# Patient Record
Sex: FEMALE | Race: WHITE
[De-identification: names, ages, dates, MRNs, and addresses within clinical notes are randomized per-mention and may not be internally consistent; named-entity substitution may affect disease eponyms.]

---

## 2017-02-28 ENCOUNTER — HOSPITAL ENCOUNTER (EMERGENCY)
Dept: HOSPITAL 25 - ED | Age: 72
Discharge: HOME | End: 2017-02-28
Payer: MEDICARE

## 2017-02-28 VITALS — DIASTOLIC BLOOD PRESSURE: 66 MMHG | SYSTOLIC BLOOD PRESSURE: 151 MMHG

## 2017-02-28 DIAGNOSIS — R04.0: Primary | ICD-10-CM

## 2017-02-28 DIAGNOSIS — Z87.891: ICD-10-CM

## 2017-02-28 LAB
HCT VFR BLD AUTO: 41 % (ref 35–47)
HGB BLD-MCNC: 14 G/DL (ref 12–16)
MCH RBC QN AUTO: 30 PG (ref 27–31)
MCHC RBC AUTO-ENTMCNC: 34 G/DL (ref 31–36)
MCV RBC AUTO: 87 FL (ref 80–97)
RBC # BLD AUTO: 4.73 10^6/UL (ref 4–5.4)
WBC # BLD AUTO: 10.3 10^3/UL (ref 3.5–10.8)

## 2017-02-28 PROCEDURE — 85610 PROTHROMBIN TIME: CPT

## 2017-02-28 PROCEDURE — 85730 THROMBOPLASTIN TIME PARTIAL: CPT

## 2017-02-28 PROCEDURE — 99283 EMERGENCY DEPT VISIT LOW MDM: CPT

## 2017-02-28 PROCEDURE — 85025 COMPLETE CBC W/AUTO DIFF WBC: CPT

## 2017-02-28 PROCEDURE — 36415 COLL VENOUS BLD VENIPUNCTURE: CPT

## 2017-02-28 NOTE — ED
Throat Pain/Nasal Congestion





- HPI Summary


HPI Summary: 


71F presents with nose bleed for a day.  She takes eliquis, brilinta, and 

aspirin every day for stent placement for MI.  She has had a history of bleeds 

due to being on blood thinners.  She states the bleeding started in her left 

nostril but then also started in the right nostril.  She says that she placed 

ice on the area and held pressure and it seems to slow it down last night but 

then she stood up and the bleeding was there again.  She denies any dizziness 

or lightheadedness.








- History of Current Complaint


Chief Complaint: EDEpistaxis


Time Seen by Provider: 02/28/17 10:49





- Allergies/Home Medications


Allergies/Adverse Reactions: 


 Allergies











Allergy/AdvReac Type Severity Reaction Status Date / Time


 


Bee Venom Allergy Severe Anaphylatic Verified 09/01/14 19:50





   Shock  


 


Sulfa Antibiotics Allergy Severe See Comment Verified 09/01/14 19:50














PMH/Surg Hx/FS Hx/Imm Hx


Endocrine/Hematology History: Reports: Hx Anticoagulant Therapy, Hx Thyroid 

Disease - hypothyroidism


Cardiovascular History: Reports: Hx Atrial Fibrillation, Hx Coronary Artery 

Disease, Hx Hypertension, Other Cardiovascular Problems/Disorders - CHOLESTEROL 

SLIGHTLY ELEVATED-NO MEDS, MYOPATHY


Respiratory History: Reports: Other Respiratory Problems/Disorders - new 

diagnosis emphysema 3 weeks ago


Musculoskeletal History: Reports: Hx Bursitis - RIGHT SHOULDER, Other 

Musculoskeletal History - NO ARTHRITIS, MUSCLES AND JOINTS HURT, MAINLY MUSCLES


Sensory History: Reports: Hx Cataracts - removed, Hx Contacts or Glasses - 

reading only


   Denies: Hx Hearing Aid


Opthamlomology History: Reports: Hx Cataracts - removed, Hx Contacts or Glasses 

- reading only





- Cancer History


Hx Chemotherapy: No


Hx Radiation Therapy: No





- Surgical History


Surgery Procedure, Year, and Place: 1988 LAPAROSCOPIC BILATERAL TUBAL LIGATION, 

[facility].  basal carcinoma face removed 1980


Hx Anesthesia Reactions: Yes - PANIC ATTACK WHEN WAKING UP/ EPINEPHRINE - MAKES 

JITTERY


Infectious Disease History: No


Infectious Disease History: Reports: Hx Shingles


   Denies: Hx Clostridium Difficile, Hx Hepatitis - Pt states, NEVER had 

hepatitis, has been erroniously in chart for years, Hx Human Immunodeficiency 

Virus (HIV), Hx of Known/Suspected MRSA, Hx Tuberculosis, Hx Known/Suspected VRE

, Hx Known/Suspected VRSA, History Other Infectious Disease, Traveled Outside 

the [place] in Last 30 Days





- Family History


Known Family History: Positive: Cardiac Disease, Other - CANCER





- Social History


Alcohol Use: None


Alcohol Amount: 2-3X/WEEK


Hx Substance Use: No


Substance Use Type: Reports: None


Hx Tobacco Use: Yes - SMOKED IN 20s


Smoking Status (MU): Former Smoker


Length of Time of Smoking/Using Tobacco: "smoked during my twenties"





Review of Systems


Negative: Fever


Positive: Epistaxis


Negative: Chest Pain


Negative: Shortness Of Breath


All Other Systems Reviewed And Are Negative: Yes





Physical Exam


Triage Information Reviewed: Yes


Vital Signs On Initial Exam: 


 Initial Vitals











Temp Pulse Resp BP Pulse Ox


 


 98.1 F   57   20   137/59   99 


 


 02/28/17 10:35  02/28/17 10:35  02/28/17 10:35  02/28/17 10:35  02/28/17 10:35











Vital Signs Reviewed: Yes


Appearance: Positive: Well-Appearing


Skin: Positive: Warm, Dry


Head/Face: Positive: Normal Head/Face Inspection


Eyes: Positive: Normal, Conjunctiva Clear


ENT: Positive: Other - lots of clotted blood in both nostrils, with mild amount 

of oozing blood present, no area seen can cauterize


Respiratory/Lung Sounds: Positive: Clear to Auscultation, Breath Sounds Present


Cardiovascular: Positive: Normal, RRR





Procedures





- Procedure Summary


Procedure Summary: 


with dr felipe placed nasal balloon and inflated with 15 cc in posterior 

balloon and 5 cc in anterior balloon 





Diagnostics





- Vital Signs


 Vital Signs











  Temp Pulse Resp BP Pulse Ox


 


 02/28/17 11:18  97.5 F  51  18  116/55  96


 


 02/28/17 11:12  97.5 F  51  18  116/55  95


 


 02/28/17 10:35  98.1 F  57  20  137/59  99














- Laboratory


Result Diagrams: 


 02/28/17 11:30





Lab Statement: Any lab studies that have been ordered have been reviewed, and 

results considered in the medical decision making process.





EENT Course/Dx





- Course


Course Of Treatment: 71F presents with epistaxis for a day. coming from both 

nostrils but the left more than the right. on eliquis, brilinita, and aspirin.  

tried to place compression on area and did not stop, with dr felipe placed 

nasal balloon into left nostril and inflated it, bleeding slowed into an 

occasionally ooze, started on augmentin and will have follow up with ENT, 

talked with dr squires and said can hold eliquis only, patient understands adn 

agrees with plan





- Differential Diagnoses


Differential Diagnoses: Allergic Rhinitis, Epistaxis, Sinusitis





- Diagnoses


Provider Diagnoses: 


 Epistaxis








- Provider Notifications


Discussed Care of Patient with: dr squires


Time Discussed With Above Provider: 12:41 - can stop eliquis only





Discharge





- Discharge Plan


Condition: Stable


Disposition: HOME


Prescriptions: 


Amoxicillin/Clavulanate TAB* [Augmentin  mg*] 500 mg PO BID #10 tab


Patient Education Materials:  Nosebleed (ED)


Referrals: 


Cryer,Jonathan, MD [Medical Doctor] - 


Susana Castañeda MD [Primary Care Provider] - 


Additional Instructions: 


Follow up with ENT within 3 days


Take Augmentin twice a day until tampon removed by ENT


Stop Eliquis for 2 days, continue brilinta and aspirin 


Return to ED if develop dizziness, rebleeding, or any new or worsening symptoms

## 2017-04-21 ENCOUNTER — HOSPITAL ENCOUNTER (INPATIENT)
Dept: HOSPITAL 25 - ED | Age: 72
LOS: 2 days | Discharge: HOME | DRG: 309 | End: 2017-04-23
Attending: HOSPITALIST | Admitting: HOSPITALIST
Payer: MEDICARE

## 2017-04-21 DIAGNOSIS — E78.5: ICD-10-CM

## 2017-04-21 DIAGNOSIS — E03.9: ICD-10-CM

## 2017-04-21 DIAGNOSIS — Z88.1: ICD-10-CM

## 2017-04-21 DIAGNOSIS — Z80.9: ICD-10-CM

## 2017-04-21 DIAGNOSIS — Z87.891: ICD-10-CM

## 2017-04-21 DIAGNOSIS — Z98.42: ICD-10-CM

## 2017-04-21 DIAGNOSIS — I49.5: ICD-10-CM

## 2017-04-21 DIAGNOSIS — Z85.828: ICD-10-CM

## 2017-04-21 DIAGNOSIS — Z79.82: ICD-10-CM

## 2017-04-21 DIAGNOSIS — I25.10: ICD-10-CM

## 2017-04-21 DIAGNOSIS — Z86.19: ICD-10-CM

## 2017-04-21 DIAGNOSIS — Z95.5: ICD-10-CM

## 2017-04-21 DIAGNOSIS — Z91.030: ICD-10-CM

## 2017-04-21 DIAGNOSIS — I11.9: ICD-10-CM

## 2017-04-21 DIAGNOSIS — I48.0: Primary | ICD-10-CM

## 2017-04-21 DIAGNOSIS — Z98.41: ICD-10-CM

## 2017-04-21 DIAGNOSIS — R40.2412: ICD-10-CM

## 2017-04-21 DIAGNOSIS — I42.2: ICD-10-CM

## 2017-04-21 DIAGNOSIS — Z82.49: ICD-10-CM

## 2017-04-21 DIAGNOSIS — Z88.2: ICD-10-CM

## 2017-04-21 DIAGNOSIS — I25.2: ICD-10-CM

## 2017-04-21 DIAGNOSIS — I24.8: ICD-10-CM

## 2017-04-21 DIAGNOSIS — Z79.01: ICD-10-CM

## 2017-04-21 LAB
ALBUMIN SERPL BCG-MCNC: 3.7 G/DL (ref 3.2–5.2)
ALP SERPL-CCNC: 92 U/L (ref 34–104)
ALT SERPL W P-5'-P-CCNC: 20 U/L (ref 7–52)
ANION GAP SERPL CALC-SCNC: 6 MMOL/L (ref 2–11)
AST SERPL-CCNC: 18 U/L (ref 13–39)
BUN SERPL-MCNC: 15 MG/DL (ref 6–24)
BUN/CREAT SERPL: 21.7 (ref 8–20)
CALCIUM SERPL-MCNC: 8.6 MG/DL (ref 8.6–10.3)
CHLORIDE SERPL-SCNC: 106 MMOL/L (ref 101–111)
GLOBULIN SER CALC-MCNC: 2.4 G/DL (ref 2–4)
GLUCOSE SERPL-MCNC: 132 MG/DL (ref 70–100)
HCO3 SERPL-SCNC: 24 MMOL/L (ref 22–32)
HCT VFR BLD AUTO: 41 % (ref 35–47)
HGB BLD-MCNC: 13.9 G/DL (ref 12–16)
MAGNESIUM SERPL-MCNC: 2.2 MG/DL (ref 1.9–2.7)
MCH RBC QN AUTO: 30 PG (ref 27–31)
MCHC RBC AUTO-ENTMCNC: 34 G/DL (ref 31–36)
MCV RBC AUTO: 88 FL (ref 80–97)
POTASSIUM SERPL-SCNC: 3.7 MMOL/L (ref 3.5–5)
PROT SERPL-MCNC: 6.1 G/DL (ref 6.4–8.9)
RBC # BLD AUTO: 4.7 10^6/UL (ref 4–5.4)
SODIUM SERPL-SCNC: 136 MMOL/L (ref 133–145)
TROPONIN I SERPL-MCNC: 0.04 NG/ML (ref ?–0.04)
WBC # BLD AUTO: 7 10^3/UL (ref 3.5–10.8)

## 2017-04-21 PROCEDURE — 80053 COMPREHEN METABOLIC PANEL: CPT

## 2017-04-21 PROCEDURE — 87502 INFLUENZA DNA AMP PROBE: CPT

## 2017-04-21 PROCEDURE — 85025 COMPLETE CBC W/AUTO DIFF WBC: CPT

## 2017-04-21 PROCEDURE — 93005 ELECTROCARDIOGRAM TRACING: CPT

## 2017-04-21 PROCEDURE — 83880 ASSAY OF NATRIURETIC PEPTIDE: CPT

## 2017-04-21 PROCEDURE — 36415 COLL VENOUS BLD VENIPUNCTURE: CPT

## 2017-04-21 PROCEDURE — 81015 MICROSCOPIC EXAM OF URINE: CPT

## 2017-04-21 PROCEDURE — 71010: CPT

## 2017-04-21 PROCEDURE — 83735 ASSAY OF MAGNESIUM: CPT

## 2017-04-21 PROCEDURE — 77067 SCR MAMMO BI INCL CAD: CPT

## 2017-04-21 PROCEDURE — G0378 HOSPITAL OBSERVATION PER HR: HCPCS

## 2017-04-21 PROCEDURE — 83605 ASSAY OF LACTIC ACID: CPT

## 2017-04-21 PROCEDURE — 87086 URINE CULTURE/COLONY COUNT: CPT

## 2017-04-21 PROCEDURE — 85730 THROMBOPLASTIN TIME PARTIAL: CPT

## 2017-04-21 PROCEDURE — 81003 URINALYSIS AUTO W/O SCOPE: CPT

## 2017-04-21 PROCEDURE — 84484 ASSAY OF TROPONIN QUANT: CPT

## 2017-04-21 PROCEDURE — 84520 ASSAY OF UREA NITROGEN: CPT

## 2017-04-21 PROCEDURE — G0202 SCR MAMMO BI INCL CAD: HCPCS

## 2017-04-21 PROCEDURE — 85610 PROTHROMBIN TIME: CPT

## 2017-04-21 NOTE — RAD
INDICATION: Chest pain     



COMPARISON: June 04, 2016

 

TECHNIQUE: An AP portable view obtained at 2237 hours is submitted.



FINDINGS: 



Bones/Soft Tissues: There are no acute bony findings.    



Cardiomediastinal: The cardiomediastinal silhouette is normal. 



Lungs: There are no infiltrates.



Pleura: There are no pleural effusions. 



Other: None



IMPRESSION:  NO ACTIVE DISEASE.

## 2017-04-22 LAB
BUN SERPL-MCNC: 14 MG/DL (ref 6–24)
HCT VFR BLD AUTO: 42 % (ref 35–47)
HGB BLD-MCNC: 13.9 G/DL (ref 12–16)
MCH RBC QN AUTO: 30 PG (ref 27–31)
MCHC RBC AUTO-ENTMCNC: 34 G/DL (ref 31–36)
MCV RBC AUTO: 88 FL (ref 80–97)
RBC # BLD AUTO: 4.73 10^6/UL (ref 4–5.4)
TROPONIN I SERPL-MCNC: 0.85 NG/ML (ref ?–0.04)
WBC # BLD AUTO: 7.8 10^3/UL (ref 3.5–10.8)

## 2017-04-22 RX ADMIN — METOPROLOL SUCCINATE SCH MG: 25 TABLET, EXTENDED RELEASE ORAL at 09:52

## 2017-04-22 RX ADMIN — LISINOPRIL SCH MG: 10 TABLET ORAL at 09:52

## 2017-04-22 RX ADMIN — ASPIRIN SCH MG: 81 TABLET, COATED ORAL at 09:53

## 2017-04-22 RX ADMIN — THERA TABS SCH TAB: TAB at 09:52

## 2017-04-22 RX ADMIN — DOFETILIDE SCH MCG: 0.5 CAPSULE ORAL at 09:53

## 2017-04-22 RX ADMIN — Medication SCH UNITS: at 09:53

## 2017-04-22 RX ADMIN — DILTIAZEM HYDROCHLORIDE SCH MG: 120 CAPSULE, COATED, EXTENDED RELEASE ORAL at 09:53

## 2017-04-22 RX ADMIN — TICAGRELOR SCH MG: 90 TABLET ORAL at 09:52

## 2017-04-22 RX ADMIN — DOFETILIDE SCH MCG: 0.5 CAPSULE ORAL at 21:44

## 2017-04-22 RX ADMIN — LEVOTHYROXINE SODIUM SCH MCG: 112 TABLET ORAL at 07:57

## 2017-04-22 RX ADMIN — HEPARIN SODIUM AND DEXTROSE SCH MLS/HR: 5000; 5 INJECTION INTRAVENOUS at 07:56

## 2017-04-22 RX ADMIN — TICAGRELOR SCH MG: 90 TABLET ORAL at 21:44

## 2017-04-22 NOTE — CONSULT
Subjective


Date of Service: 04/22/17


Interval History: 





4/22/2017 admit and consult date


Service: Hospitalist


PMD: Dr. Jose Juan Aponte


Cardiologist: Dr. Jarrod Lafleur


CC: chest pain, palpitations


Reason for consult: atrial fibrillation, elevated troponin





HPI


Aby Maynard is a 71 year old woman with prior uncontrolled hypertension long-

standing, LVH probably secondary to hypertensive heart disease but cannot 

exclude hypertrophic cardiomyopathy, highly symptomatic paroxysmal atrial 

fibrillation currently on tikosyn.  In May 2016 she had symptomatic rapid AFib 

and had a post-conversion junctional bradycardia of 30 beats per minute after 

receiving IV diltiazem and lopressor.  There was no reported syncope from my 

review of chart.  Her troponin ultimately went to 11.  She had a coronary 

angiogram and PCI/SUMEET of a severely obstructive distal LAD lesion.  There was a 

residual intermediate LAD lesion.  She was hospitalized June 2016 with another 

episode of atrial fibrillation of much shorter duration and tikosyn was 

started.  The troponin because of demand ischemia went to 1.7 that admission.  

She has had 2 or 3 further episodes of atrial fibrillation that resolved within 

several hours and did not come to the ER.  She had hematuria, rectal bleeding, 

gum bleeding and epistaxis while on aspirin, brillinta and eliquis.   She is 

now currently only on aspirin and brillinta.  There are plans to change the 

brillinta next month to coumadin after a year of DAPT.  Patient had a recent 

URI earlier this week but was beginning to recover.  Last evening when watching 

TV she had sudden onset palpitations and pressure across the chest that is her 

typical atrial fibrillation symptoms.  She also vomited three times..  She 

received IV cardizem in the ER and remembers the exact moment she converted 

from Afib to SR because her symptoms entirely resolved and have not returned.  

Other than this episode, she denies any exertional chest discomfort, change in 

breathing, edema or orthopnea or syncope.  





Allergies: Sulfa 07/14/14


Bee Sting 12/08/15 


allergy list reviewed on 04/03/2017





PMH:


Basal Cell CA - hx of on face


Atrial Fibrillation


Hypertension


Hypothyroidism


Coronary Artery Disease (CAD) - (5/2016) distal LAD





Surgical Hx:


Laproscopic Tubal Ligation - (1988)





FH:


Heart Disease, Cancer.


Father:


Coronary Artery Disease (CAD) - 68 yo.


Mother:


Coronary Artery Disease (CAD) - 56 yo. 





SH:


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 

agent


Smoking: Patient is a former smoker - quit in 1975.


Denies alcohol use.Drug Use: 





Medications


Active Medications: 








Aspirin (Aspirin Ec Low Dose*)  81 mg PO DAILY [facility]


   Last Admin: 04/22/17 09:53 Dose:  81 mg


Atorvastatin Calcium (Lipitor*)  40 mg PO BEDTIME [facility]


Diltiazem HCl (Cardizem Cd Cap*)  120 mg PO DAILY [facility]


   Last Admin: 04/22/17 09:53 Dose:  120 mg


Dofetilide (Tikosyn Cap*)  500 mcg PO BID [facility]


   Last Admin: 04/22/17 09:53 Dose:  500 mcg


Heparin Sodium (Porcine) (Heparin Vial(*))  0 units IV .PER PROTOCOL [facility]


   PRN Reason: Protocol


Heparin Sodium/Dextrose (Heparin Drip 25,000 Units(*))  25,000 units in 500 mls 

@ 0 mls/hr IV .NO INITIAL BOLUS [facility]; As Directed


   PRN Reason: Protocol


   Last Admin: 04/22/17 07:56 Dose:  26 mls/hr


Levothyroxine Sodium (Synthroid Tab*)  112 mcg PO 0600 [facility]


   Last Admin: 04/22/17 07:57 Dose:  112 mcg


Lisinopril (Prinivil Tab*)  20 mg PO DAILY [facility]


   Last Admin: 04/22/17 09:52 Dose:  20 mg


Metoprolol Succinate (Toprol Xl Tab*)  12.5 mg PO DAILY [facility]


   Last Admin: 04/22/17 09:52 Dose:  12.5 mg


Multivitamins/Minerals (Theragran/Minerals Tab*)  1 tab PO DAILY [facility]


   Last Admin: 04/22/17 09:52 Dose:  1 tab


Ticagrelor (Brilinta*)  90 mg PO BID [facility]


   Last Admin: 04/22/17 09:52 Dose:  90 mg


Vitamin E (Vitamin E Cap*)  200 units PO DAILY [facility]


   Last Admin: 04/22/17 09:53 Dose:  200 units





Home Medications: 


 





Levothyroxine TAB* [Synthroid 125 MCG TAB*] 112 mcg PO QAM 05/06/13 [History 

Confirmed 04/22/17]


Diltiazem HCl Coated Beads [Cartia Xt] 120 mg PO DAILY 05/09/16 [History 

Confirmed 04/22/17]


Glucosamine-Chondroitin-Vit C- [Glucosamine Chondroitin] 1 tab PO BID 05/09/16 [

History Confirmed 04/22/17]


Multivitamins/Minerals TAB* [Theragran/minerals TAB*] 1 tab PO DAILY 05/09/16 [

History Confirmed 04/22/17]


Vitamin E CAP* 200 unit PO DAILY 05/09/16 [History Confirmed 04/22/17]


Aspirin EC Low Dose* [Ecotrin EC Low Dose 81 MG*] 81 mg PO DAILY  tab.ec 05/12/ 16 [Rx Confirmed 06/04/16]


Atorvastatin* [Lipitor 40 MG*] 40 mg PO BEDTIME #30 tab 05/12/16 [Rx Confirmed 

04/22/17]


Ticagrelor* [Brilinta 90 MG*] 90 mg PO BID #60 tab 05/12/16 [Rx Confirmed 04/22/ 17]


Lisinopril TAB* [Prinivil TAB 10 MG*] 20 mg PO DAILY 06/04/16 [History 

Confirmed 04/22/17]


Dofetilide CAP* [Tikosyn CAP*] 500 mcg PO BID #60 cap 06/07/16 [Rx Confirmed 04/ 22/17]


Cholecalciferol [Vitamin D 400] 800 unit PO DAILY 04/22/17 [History Confirmed 04 /22/17]


Coenzyme Q10 (Ubidecarenone) [Co Q-10] 100 mg PO DAILY 04/22/17 [History 

Confirmed 04/22/17]


Metoprolol Succinate [Toprol Xl] 12.5 mg PO DAILY 04/22/17 [History Confirmed 04 /22/17]








Review of Systems





- Measurements


Intake and Output: 


Intake and Output Last 24 Hours











 04/20/17 04/21/17 04/22/17 04/23/17





 06:59 06:59 06:59 06:59


 


Intake Total   50 0


 


Balance   50 0


 


Weight   165 lb 6.4 oz 


 


Intake:    


 


  IV Fluids   50 


 


  Oral    0














- Review of Systems


Constitutional Symptoms: 


   Negative: Weight Gain, Weight Loss, Weakness, Fatigue, Fever


Dermatology: 


   Negative: Rash, Skin Lesions


HEENT: 


   Negative: Change in Hearing, Vertigo


Eyes: 


   Negative: Change in Vision, Double Vision, Eye Pain


Thyroid: 


   Negative: Cold Intolerance, Heat Intolerance, Tremor, Constipation, Primary 

Hypothyroidism, Primary Hyperthyroidism, Weight Loss, Weight Gain


Pulmonary: 


   Negative: Cough, Sputum, Hemoptysis, Wheezing, Respiratory Distress, COPD, 

Asthma, Exercise Intolerance


Cardiology: Positive: Chest Pain, Palpitations


   Negative: Normal, Shortness of Breath, Swelling of Ankles, Peripheral 

Vascular Dis, Edema, Faintness, Syncope, Claudication, Paroxysmal Nocturnal 

Dyspnea, Orthopnea


Gastroenterology: Positive: Other


   Negative: Abdominal Pain, Nausea, Vomiting, Anorexia, Indigestion, 

Difficulty Swallowing, Heartburn, Constipation, Diarrhea


Genital - Urinary: 


   Negative: Dysuria, Hematuria, Polyuria


Musculoskeletal: 


   Negative: Joint Pain, Joint Stiffness, Arthritis


Endocrinology: 


   Negative: Family Hx Endocrine Disorders, Hyperglycemia


Hematologic/Lymphatic: Positive: Use of Antiplatelet Drugs


   Negative: Hx Leukemia, Hx Lymphoma, Use of Anticoagulant


Neurology: 


   Negative: Normal, Headaches, Migraines, Change in Vision, Diplopia, Dizziness

, Change in Balancing, Change in Coordination, Change in Memory, Change in 

Speech, Change in Sphincter Function, Change in Walking


Psychiatry: 


   Negative: Sexual Dysfunction, Weight Change, Guilt Feelings, Tearfulness, 

Unusual Fatigue, Unusual Anxiety, Suicidal Ideation


Allergic/Immunologic: 


   Negative: Athsma, Hx HIV, Immunocompromise, Swollen Glands Lymph Nodes


Review of Systems Statement: 


All other review of systems negative, unless stated above.








Objective


Vital Signs:











Temp Pulse Resp BP Pulse Ox


 


 97.0 F   55   18   148/71   99 


 


 04/22/17 12:07  04/22/17 12:07  04/22/17 12:07  04/22/17 12:07  04/22/17 12:07











Appearance: nad, pleasant


Ears/Nose/Mouth/Throat: Clear Oropharnyx, Mucous Membranes Moist


Neck: NL Appearance and Movements; NL JVP


Respiratory: Symmetrical Chest Expansion and Respiratory Effort, Clear to 

Auscultation


Cardiovascular: NL Sounds; No Murmurs; No JVD, RRR, No Edema


Abdominal: NL Sounds; No Tenderness; No Distention


Extremities: No Edema


Skin: No Rash or Ulcers


Neurological: Alert and Oriented x 3


Laboratory Results: 


 





 


 





 04/22/17 03:50 





 04/22/17 03:50 





 











INR (Anticoag Therapy)  0.89  (0.89-1.11)   04/22/17  03:50    


 


APTT  26.2 seconds (26.0-36.3)   04/22/17  03:50    


 


Total Bilirubin  0.40 mg/dL (0.2-1.0)   04/21/17  22:30    


 


AST  18 U/L (13-39)   04/21/17  22:30    


 


ALT  20 U/L (7-52)   04/21/17  22:30    


 


Alkaline Phosphatase  92 U/L ()   04/21/17  22:30    


 


B-Natriuretic Peptide  183 pg/mL (-100) H  04/21/17  22:30    


 


Total Protein  6.1 g/dL (6.4-8.9)  L  04/21/17  22:30    


 


Albumin  3.7 g/dL (3.2-5.2)   04/21/17  22:30    


 


Globulin  2.4 g/dL (2-4)   04/21/17  22:30    


 


Albumin/Globulin Ratio  1.5  (1-3)   04/21/17  22:30    








 











  04/21/17 04/22/17 04/22/17





  22:30 01:05 03:50


 


Troponin I  0.04 H*  0.17 H*  0.85 H*














  04/22/17 04/22/17





  06:24 12:38


 


Troponin I  1.17 H*  1.19 H*














Diagnostic Imaging: 


Cardiac Testing:


Stress Test - (12/21/2016)  Normal cardiac chemical nuclear stress test. Unable 

to achieve THR w/exercise. No evidence of infarction/ischemia. Normal LVF. EF 73

%.





Echocardiogram - (5/10/2016)  


Moderate concentric LVH. Increase basal septal hypertrophy without evidence of 

an increased gradient across LV outflow tract. LV appears hyperdynamic. 

Parasternal short axis views are of axis making wall motion assessment 

unreliable. Estimated EF >65%. Abnormal LV diastolic filling, consistent with 

impaired relaxation. Left atrium mild to moderately dilated. Trace aortic 

regurgitation. Posterior mitral annular calcification. Borderline mitral 

stenosis. Mild MR. No systolic anterior motion visualized. Mild to moderate TR. 

Mild PHTN. Trace to mild pulmonic regurgitation. 





cardiac catheterization with SUMEET PCI to the distal LAD on 05/10/2016, with a 

residual 50% mid LAD stenosis and no significant CAD in the RCA nor left 

circumflex vessels.





She had a transthoracic echocardiogram ordered 06/17/2016, which showed normal 

left ventricular size with hyperdynamic left ventricular ejection fraction 

greater than 70% with moderate concentric left ventricular hypertrophy of the 

left ventricle with slight septal prominence, diastolic dysfunction, 

hypertrophic cardiomyopathy with severe LVOT gradient up to 110 mmHg with the 

valsalva maneuver, severe left atrial dilatation, functionally benign heart 

valves.  


EKG Data: 





EKG 4/21/2017: Afib rate 120 bpm, LVH with worsening ST depression of baseline 

abnormalities. 


EKG 4/22/2017: Sinus bradycardia 51 bpm, LVH with early repol v1-v2 and 

repolarization changes grossly unchanged from 6/7/2016





Assessment/Plan





In summary, Aby Maynard is a 71 year old woman with prior long-standing 

poorly controlled HTN, hypertensive and/or hypertrophic cardiomyopathy, highly 

symptomatic paroxysmal atrial fibrillation with prior evidence of asymptomatic 

sinus node dysfunction currently on tikosyn, CAD with probable type 2 MI 5/2016 

s/p PCI/SUMEET to distal LAD with residual intermediate mid-LAD present with 

highly symptomatic episode of paroxysmal atrial fibrillation lasting 4-5 hours 

now converted and asymptomatic in setting of recent respiratory tract 

infection.  Elevated troponin level likely supply/demand related ischemia from 

rapid atrial fibrillation in the setting of LVH and known underlying CAD.  

Clinical presentation not consistent with a type 1 plaque disruption MI.  

Presentation very similar to 6/2016 admission a month after angiogram/PCI.  

Patient states she would not want another heart catheterization even if it were 

recommended. 





- Continue aspirin and brillinta as per Dr. Lafleur


- Continue tikosyn at home dose


- Continue home dose of BB and CCB


- Continue statin


- I would continue to monitor patient and if she remains asymptomatic the 

heparin can be stopped and she could be discharged tomorrow.


- She may be an atrial fibrillation ablation candidate.


- Patient will follow up with Dr. Lafleur for further evaluation and management





Thank you for allowing me to participate in the cardiovascular care of this 

patient.  Please do not hesitate to contact me with questions or concerns.

## 2017-04-22 NOTE — PN
Hospitalist Progress Note


HOSPITALIST ADDENDUM


Patient seen and examined at bedside.


She feels much better today, chest pain is resolved.


Suspect her symptoms and troponin elevation are likely associated with her 

episode of Afib.


Continue current management and monitor.


Cardiology input appreciated.

## 2017-04-22 NOTE — ED
Mau LIU Adam, scribed for Jose L Phan on 04/21/17 at 2232 .





HPI Chest Pain





- HPI Summary


HPI Summary: 


Pt is a 71 year old female presenting with CP and palpitations. She states that 

her symptoms set on at 20:00 tonight and she suspected that it was A Fib, of 

which she has a history. She is still having CP now, primarily across the top 

of her chest. She also reports that she has had a URI for several days. She 

denies fever and edema. The pt had an MI on 05/09/16 and had 1 stent put in. 

Her last stress test was in February of 2017. Pt is a former smoker. She denies 

alcohol use. FMHx of cardiac disease in both parents. 








- History of Current Complaint


Chief Complaint: EDChestPainROMI


Time Seen by Provider: 04/21/17 21:58


Hx Obtained From: Patient


Onset/Duration: Started Hours Ago, Atraumatic, Still Present


Timing: Constant


Initial Severity: Moderate


Current Severity: Moderate


Pain Intensity: 5


Pain Scale Used: 0-10 Numeric


Chest Pain Location: Diffuse, Upper Sternal


Chest Pain Radiates: No


Aggravating Factor(s): Nothing


Alleviating Factor(s): Nothing


Associated Signs and Symptoms: Positive: Palpitations, Other: - URI sx.  

Negative: Fever, Edema





- Additional Pertinent History


Primary Care Physician: [facility]





- Allergy/Home Medications


Allergies/Adverse Reactions: 


 Allergies











Allergy/AdvReac Type Severity Reaction Status Date / Time


 


Bee Venom Allergy Severe Anaphylatic Verified 04/21/17 21:48





   Shock  


 


Sulfa Antibiotics Allergy Severe See Comment Verified 04/21/17 21:48














PMH/Surg Hx/FS Hx/Imm Hx


Endocrine/Hematology History: Reports: Hx Anticoagulant Therapy, Hx Thyroid 

Disease - hypothyroidism


Cardiovascular History: Reports: Hx Atrial Fibrillation, Hx Coronary Artery 

Disease, Hx Hypertension, Other Cardiovascular Problems/Disorders - CHOLESTEROL 

SLIGHTLY ELEVATED-NO MEDS, MYOPATHY


Respiratory History: Reports: Other Respiratory Problems/Disorders - new 

diagnosis emphysema 3 weeks ago


Musculoskeletal History: Reports: Hx Bursitis - RIGHT SHOULDER, Other 

Musculoskeletal History - NO ARTHRITIS, MUSCLES AND JOINTS HURT, MAINLY MUSCLES


Sensory History: Reports: Hx Cataracts - removed, Hx Contacts or Glasses - 

reading only


   Denies: Hx Hearing Aid


Opthamlomology History: Reports: Hx Cataracts - removed, Hx Contacts or Glasses 

- reading only





- Cancer History


Hx Chemotherapy: No


Hx Radiation Therapy: No





- Surgical History


Surgery Procedure, Year, and Place: 1988 LAPAROSCOPIC BILATERAL TUBAL LIGATION, 

[facility].  basal carcinoma face removed 1980


Hx Anesthesia Reactions: Yes - PANIC ATTACK WHEN WAKING UP/ EPINEPHRINE - MAKES 

JITTERY





- Immunization History


Date of Tetanus Vaccine: unk


Date of Influenza Vaccine: none


Infectious Disease History: No


Infectious Disease History: Reports: Hx Shingles


   Denies: Hx Clostridium Difficile, Hx Hepatitis - Pt states, NEVER had 

hepatitis, has been erroniously in chart for years, Hx Human Immunodeficiency 

Virus (HIV), Hx of Known/Suspected MRSA, Hx Tuberculosis, Hx Known/Suspected VRE

, Hx Known/Suspected VRSA, History Other Infectious Disease, Traveled Outside 

the [place] in Last 30 Days





- Family History


Known Family History: Positive: Cardiac Disease - Both parents, Other - CANCER





- Social History


Occupation: Employed Part-time


Lives: Alone


Alcohol Use: None


Hx Substance Use: No


Substance Use Type: Reports: None


Hx Tobacco Use: Yes - SMOKED IN 20s


Smoking Status (MU): Former Smoker


Length of Time of Smoking/Using Tobacco: "smoked during my twenties"





Review of Systems


Negative: Fever


Positive: Other - URI sx


Positive: Palpitations, Chest Pain


Negative: Edema


All Other Systems Reviewed And Are Negative: Yes





Physical Exam


Triage Information Reviewed: Yes


Vital Signs On Initial Exam: 


 Initial Vitals











Temp Pulse Resp BP


 


 97.5 F   118   21   130/103 


 


 04/21/17 21:42  04/21/17 21:42  04/21/17 21:42  04/21/17 21:42











Vital Signs Reviewed: Yes


Appearance: Positive: Well-Appearing, No Pain Distress


Skin: Positive: Warm, Skin Color Reflects Adequate Perfusion, Dry


Head/Face: Positive: Normal Head/Face Inspection


Eyes: Positive: EOMI, MONIK


ENT: Positive: Normal ENT inspection


Neck: Positive: Supple, Nontender


Respiratory/Lung Sounds: Positive: Clear to Auscultation, Breath Sounds Present


Cardiovascular: Positive: Tachycardia, Other - Irregularly irregular rhythm


Abdomen Description: Positive: Nontender, Soft


Bowel Sounds: Positive: Present


Musculoskeletal: Positive: Normal, Strength/ROM Intact





- Thao Coma Scale


Coma Scale Total: 15





Diagnostics





- Vital Signs


 Vital Signs











  Temp Pulse Resp BP Pulse Ox


 


 04/21/17 21:44  97.5 F  132  18  130/103  94


 


 04/21/17 21:42  97.5 F  118  21  130/103 














- Laboratory


Lab Results: 


 Lab Results











  04/21/17 04/21/17 04/21/17 Range/Units





  22:30 22:30 22:30 


 


WBC  7.0    (3.5-10.8)  10^3/ul


 


RBC  4.70    (4.0-5.4)  10^6/ul


 


Hgb  13.9    (12.0-16.0)  g/dl


 


Hct  41    (35-47)  %


 


MCV  88    (80-97)  fL


 


MCH  30    (27-31)  pg


 


MCHC  34    (31-36)  g/dl


 


RDW  13    (10.5-15)  %


 


Plt Count  153    (150-450)  10^3/ul


 


MPV  9    (7.4-10.4)  um3


 


Neut % (Auto)  66.3    (38-83)  %


 


Lymph % (Auto)  19.9 L    (25-47)  %


 


Mono % (Auto)  11.8 H    (1-9)  %


 


Eos % (Auto)  1.1    (0-6)  %


 


Baso % (Auto)  0.9    (0-2)  %


 


Absolute Neuts (auto)  4.7    (1.5-7.7)  10^3/ul


 


Absolute Lymphs (auto)  1.4    (1.0-4.8)  10^3/ul


 


Absolute Monos (auto)  0.8    (0-0.8)  10^3/ul


 


Absolute Eos (auto)  0.1    (0-0.6)  10^3/ul


 


Absolute Basos (auto)  0.1    (0-0.2)  10^3/ul


 


Absolute Nucleated RBC  0    10^3/ul


 


Nucleated RBC %  0    


 


Sodium   136   (133-145)  mmol/L


 


Potassium   3.7   (3.5-5.0)  mmol/L


 


Chloride   106   (101-111)  mmol/L


 


Carbon Dioxide   24   (22-32)  mmol/L


 


Anion Gap   6   (2-11)  mmol/L


 


BUN   15   (6-24)  mg/dL


 


Creatinine   0.69   (0.51-0.95)  mg/dL


 


Est GFR ( Amer)   107.9   (>60)  


 


Est GFR (Non-Af Amer)   83.9   (>60)  


 


BUN/Creatinine Ratio   21.7 H   (8-20)  


 


Glucose   132 H   ()  mg/dL


 


Lactic Acid    1.6  (0.5-2.0)  mmol/L


 


Calcium   8.6   (8.6-10.3)  mg/dL


 


Magnesium   2.2   (1.9-2.7)  mg/dL


 


Total Bilirubin   0.40   (0.2-1.0)  mg/dL


 


AST   18   (13-39)  U/L


 


ALT   20   (7-52)  U/L


 


Alkaline Phosphatase   92   ()  U/L


 


Troponin I   0.04 H*   (<0.04)  ng/mL


 


B-Natriuretic Peptide    ( - 100) pg/mL


 


Total Protein   6.1 L   (6.4-8.9)  g/dL


 


Albumin   3.7   (3.2-5.2)  g/dL


 


Globulin   2.4   (2-4)  g/dL


 


Albumin/Globulin Ratio   1.5   (1-3)  














  04/21/17 04/22/17 Range/Units





  22:30 01:05 


 


WBC    (3.5-10.8)  10^3/ul


 


RBC    (4.0-5.4)  10^6/ul


 


Hgb    (12.0-16.0)  g/dl


 


Hct    (35-47)  %


 


MCV    (80-97)  fL


 


MCH    (27-31)  pg


 


MCHC    (31-36)  g/dl


 


RDW    (10.5-15)  %


 


Plt Count    (150-450)  10^3/ul


 


MPV    (7.4-10.4)  um3


 


Neut % (Auto)    (38-83)  %


 


Lymph % (Auto)    (25-47)  %


 


Mono % (Auto)    (1-9)  %


 


Eos % (Auto)    (0-6)  %


 


Baso % (Auto)    (0-2)  %


 


Absolute Neuts (auto)    (1.5-7.7)  10^3/ul


 


Absolute Lymphs (auto)    (1.0-4.8)  10^3/ul


 


Absolute Monos (auto)    (0-0.8)  10^3/ul


 


Absolute Eos (auto)    (0-0.6)  10^3/ul


 


Absolute Basos (auto)    (0-0.2)  10^3/ul


 


Absolute Nucleated RBC    10^3/ul


 


Nucleated RBC %    


 


Sodium    (133-145)  mmol/L


 


Potassium    (3.5-5.0)  mmol/L


 


Chloride    (101-111)  mmol/L


 


Carbon Dioxide    (22-32)  mmol/L


 


Anion Gap    (2-11)  mmol/L


 


BUN    (6-24)  mg/dL


 


Creatinine    (0.51-0.95)  mg/dL


 


Est GFR ( Amer)    (>60)  


 


Est GFR (Non-Af Amer)    (>60)  


 


BUN/Creatinine Ratio    (8-20)  


 


Glucose    ()  mg/dL


 


Lactic Acid    (0.5-2.0)  mmol/L


 


Calcium    (8.6-10.3)  mg/dL


 


Magnesium    (1.9-2.7)  mg/dL


 


Total Bilirubin    (0.2-1.0)  mg/dL


 


AST    (13-39)  U/L


 


ALT    (7-52)  U/L


 


Alkaline Phosphatase    ()  U/L


 


Troponin I   0.17 H*  (<0.04)  ng/mL


 


B-Natriuretic Peptide  183 H  ( - 100) pg/mL


 


Total Protein    (6.4-8.9)  g/dL


 


Albumin    (3.2-5.2)  g/dL


 


Globulin    (2-4)  g/dL


 


Albumin/Globulin Ratio    (1-3)  











Result Diagrams: 


 04/21/17 22:30





 04/21/17 22:30


Lab Statement: Any lab studies that have been ordered have been reviewed, and 

results considered in the medical decision making process.





- Radiology


  ** CXR


Radiology Interpretation Completed By: Radiologist - IMPRESSION: NO ACTIVE 

DISEASE.





- EKG


  ** 21:44


Cardiac Rate: Tachycardia - 123 BPM


EKG Rhythm: Atrial Fibrillation - with rapid ventricular rate





- Additional Comments


Diagnostic Additional Comments: 


Troponin I - 0.04








Chest Pain Course/Dx





- Course


Course Of Treatment: 01:40 - Admit to Dr. Urbina (hospitalist).





- Diagnoses


Provider Diagnoses: 


 Atrial fibrillation with RVR, Chest pain, ACS (acute coronary syndrome)








- Critical Care Time


Critical Care Time: 30-74 min





Discharge





- Discharge Plan


Condition: Stable


Disposition: ADMITTED TO [facility]


Referrals: 


Susana Castañeda MD [Primary Care Provider] - 





The documentation as recorded by the Mau abarca Adam accurately reflects 

the service I personally performed and the decisions made by me, Jose L Phan.

## 2017-04-23 VITALS — SYSTOLIC BLOOD PRESSURE: 127 MMHG | DIASTOLIC BLOOD PRESSURE: 66 MMHG

## 2017-04-23 LAB
HCT VFR BLD AUTO: 40 % (ref 35–47)
HGB BLD-MCNC: 13.3 G/DL (ref 12–16)
MCH RBC QN AUTO: 30 PG (ref 27–31)
MCHC RBC AUTO-ENTMCNC: 34 G/DL (ref 31–36)
MCV RBC AUTO: 89 FL (ref 80–97)
RBC # BLD AUTO: 4.49 10^6/UL (ref 4–5.4)
WBC # BLD AUTO: 7 10^3/UL (ref 3.5–10.8)

## 2017-04-23 RX ADMIN — LEVOTHYROXINE SODIUM SCH MCG: 112 TABLET ORAL at 05:31

## 2017-04-23 RX ADMIN — ASPIRIN SCH MG: 81 TABLET, COATED ORAL at 09:04

## 2017-04-23 RX ADMIN — Medication SCH UNITS: at 09:05

## 2017-04-23 RX ADMIN — METOPROLOL SUCCINATE SCH: 25 TABLET, EXTENDED RELEASE ORAL at 09:09

## 2017-04-23 RX ADMIN — DOFETILIDE SCH MCG: 0.5 CAPSULE ORAL at 09:05

## 2017-04-23 RX ADMIN — HEPARIN SODIUM AND DEXTROSE SCH MLS/HR: 5000; 5 INJECTION INTRAVENOUS at 06:45

## 2017-04-23 RX ADMIN — TICAGRELOR SCH MG: 90 TABLET ORAL at 09:05

## 2017-04-23 RX ADMIN — DILTIAZEM HYDROCHLORIDE SCH: 120 CAPSULE, COATED, EXTENDED RELEASE ORAL at 11:12

## 2017-04-23 RX ADMIN — LISINOPRIL SCH MG: 10 TABLET ORAL at 09:04

## 2017-04-23 RX ADMIN — THERA TABS SCH TAB: TAB at 09:04

## 2017-04-23 NOTE — PN
Subjective


Date of Service: 04/23/17


Interval History: 





f/u Afib, demand ischemia





no chest pain, dyspnea, palpitations or dyspnea


still has URI symptoms





tele: sinus bradycardia, sinus rhythm, no arrhythmias








Medications


Active Medications: 








Aspirin (Aspirin Ec Low Dose*)  81 mg PO DAILY [facility]


   Last Admin: 04/22/17 09:53 Dose:  81 mg


Atorvastatin Calcium (Lipitor*)  40 mg PO BEDTIME [facility]


   Last Admin: 04/22/17 21:44 Dose:  40 mg


Diltiazem HCl (Cardizem Cd Cap*)  120 mg PO DAILY [facility]


   Last Admin: 04/22/17 09:53 Dose:  120 mg


Dofetilide (Tikosyn Cap*)  500 mcg PO BID [facility]


   Last Admin: 04/22/17 21:44 Dose:  500 mcg


Levothyroxine Sodium (Synthroid Tab*)  112 mcg PO 0600 [facility]


   Last Admin: 04/23/17 05:31 Dose:  112 mcg


Lisinopril (Prinivil Tab*)  20 mg PO DAILY [facility]


   Last Admin: 04/22/17 09:52 Dose:  20 mg


Metoprolol Succinate (Toprol Xl Tab*)  12.5 mg PO DAILY [facility]


   Last Admin: 04/22/17 09:52 Dose:  12.5 mg


Multivitamins/Minerals (Theragran/Minerals Tab*)  1 tab PO DAILY [facility]


   Last Admin: 04/22/17 09:52 Dose:  1 tab


Ticagrelor (Brilinta*)  90 mg PO BID [facility]


   Last Admin: 04/22/17 21:44 Dose:  90 mg


Vitamin E (Vitamin E Cap*)  200 units PO DAILY [facility]


   Last Admin: 04/22/17 09:53 Dose:  200 units








Objective


Vital Signs:











Temp Pulse Resp BP Pulse Ox


 


 97.7 F   50   18   127/66   97 


 


 04/23/17 07:16  04/23/17 07:16  04/23/17 07:31  04/23/17 07:16  04/23/17 07:16











Appearance: nad, pleasant


Ears/Nose/Mouth/Throat: Clear Oropharnyx, Mucous Membranes Moist


Neck: NL Appearance and Movements; NL JVP


Respiratory: Symmetrical Chest Expansion and Respiratory Effort, Clear to 

Auscultation


Cardiovascular: NL Sounds; No Murmurs; No JVD, RRR, No Edema


Abdominal: NL Sounds; No Tenderness; No Distention


Extremities: No Edema


Skin: No Rash or Ulcers


Neurological: Alert and Oriented x 3


Laboratory Results: 


 





 04/23/17 01:53 





 











INR (Anticoag Therapy)  0.89  (0.89-1.11)   04/22/17  03:50    


 


APTT  90.7 seconds (26.0-36.3)  H  04/23/17  01:53    


 


Total Bilirubin  0.40 mg/dL (0.2-1.0)   04/21/17  22:30    


 


AST  18 U/L (13-39)   04/21/17  22:30    


 


ALT  20 U/L (7-52)   04/21/17  22:30    


 


Alkaline Phosphatase  92 U/L ()   04/21/17  22:30    


 


B-Natriuretic Peptide  183 pg/mL (-100) H  04/21/17  22:30    


 


Total Protein  6.1 g/dL (6.4-8.9)  L  04/21/17  22:30    


 


Albumin  3.7 g/dL (3.2-5.2)   04/21/17  22:30    


 


Globulin  2.4 g/dL (2-4)   04/21/17  22:30    


 


Albumin/Globulin Ratio  1.5  (1-3)   04/21/17  22:30    











Diagnostic Imaging: 


Cardiac Testing:


Stress Test - (12/21/2016)  Normal cardiac chemical nuclear stress test. Unable 

to achieve THR w/exercise. No evidence of infarction/ischemia. Normal LVF. EF 73

%.





Echocardiogram - (5/10/2016)  


Moderate concentric LVH. Increase basal septal hypertrophy without evidence of 

an increased gradient across LV outflow tract. LV appears hyperdynamic. 

Parasternal short axis views are of axis making wall motion assessment 

unreliable. Estimated EF >65%. Abnormal LV diastolic filling, consistent with 

impaired relaxation. Left atrium mild to moderately dilated. Trace aortic 

regurgitation. Posterior mitral annular calcification. Borderline mitral 

stenosis. Mild MR. No systolic anterior motion visualized. Mild to moderate TR. 

Mild PHTN. Trace to mild pulmonic regurgitation. 





cardiac catheterization with SUMEET PCI to the distal LAD on 05/10/2016, with a 

residual 50% mid LAD stenosis and no significant CAD in the RCA nor left 

circumflex vessels.





She had a transthoracic echocardiogram ordered 06/17/2016, which showed normal 

left ventricular size with hyperdynamic left ventricular ejection fraction 

greater than 70% with moderate concentric left ventricular hypertrophy of the 

left ventricle with slight septal prominence, diastolic dysfunction, 

hypertrophic cardiomyopathy with severe LVOT gradient up to 110 mmHg with the 

valsalva maneuver, severe left atrial dilatation, functionally benign heart 

valves.  


EKG Data: 





EKG 4/21/2017: Afib rate 120 bpm, LVH with worsening ST depression of baseline 

abnormalities. 


EKG 4/22/2017: Sinus bradycardia 51 bpm, LVH with early repol v1-v2 and 

repolarization changes grossly unchanged from 6/7/2016





Assessment/Plan





In summary, Aby Maynard is a 71 year old woman with prior long-standing 

poorly controlled HTN, hypertensive and/or hypertrophic cardiomyopathy, highly 

symptomatic paroxysmal atrial fibrillation with prior evidence of asymptomatic 

sinus node dysfunction currently on tikosyn, CAD with probable type 2 MI 5/2016 

s/p PCI/SUMEET to distal LAD with residual intermediate mid-LAD present with 

highly symptomatic episode of paroxysmal atrial fibrillation lasting 4-5 hours 

now converted and asymptomatic in setting of recent respiratory tract 

infection.  Elevated troponin level likely supply/demand related ischemia from 

rapid atrial fibrillation in the setting of LVH and known underlying CAD.  

Clinical presentation not consistent with a type 1 plaque disruption MI.  

Presentation very similar to 6/2016 admission a month after angiogram/PCI.  

Patient states she would not want another heart catheterization even if it were 

recommended. 





- Continue aspirin and brillinta as per Dr. Lafleur


- Continue tikosyn at home dose


- Continue home dose of BB and CCB


- Continue statin


- She may be an atrial fibrillation ablation candidate.


- Patient will follow up with Dr. Lafleur for further evaluation and management


- Afib episode lasted 4-5 hours, can d/c heparin when discharged later today





Thank you for allowing me to participate in the cardiovascular care of this 

patient.  Please do not hesitate to contact me with questions or concerns.

## 2017-04-24 NOTE — DS
DISCHARGE SUMMARY:

 

DATE OF ADMISSION:  04/22/17

 

DATE OF DISCHARGE:  04/23/17

 

PRIMARY CARE PROVIDER:  Jose Juan Aponte MD

 

CARDIOLOGIST:  Jarrod Lafleur MD

 

DISCHARGE DIAGNOSIS:  Chest pain and troponin elevation secondary to paroxysmal 
atrial fibrillation.

 

SECONDARY DIAGNOSES:

1.  Hypertension.

2.  Hypertrophic cardiomyopathy.

3.  Paroxysmal atrial fibrillation.

4.  Coronary artery disease status post stent to the LAD in May 2016.

5.  Hypothyroidism.

 

MEDICATION LIST:

1.  Aspirin 81 mg p.o. daily.

2.  Atorvastatin 40 mg p.o. at bedtime.

3.  Cholecalciferol 800 units p.o. daily.

4.  CoQ10 100 mg p.o. daily.

5.  Diltiazem  mg p.o. daily.

6.  Tikosyn 500 mcg p.o. b.i.d.

7.  Glucosamine/chondroitin/vitamin C 1 tab p.o. b.i.d.

8.  Levothyroxine 112 mcg p.o. q.a.m.

9.  Lisinopril 20 mg daily.

10.  Metoprolol succinate 12.5 mg p.o. daily.

11.  Multivitamin 1 tablet daily.

12.  Ticagrelor 90 mg p.o. b.i.d.

13.  Vitamin E 200 units p.o. daily.

 

HOSPITAL COURSE:  Mrs. Maynard is a 71-years-old lady with a past medical history 
as stated above that states that she was at home watching TV when she felt her 
heart go into atrial fibrillation again.  She had palpitations associated with 
chest pain across her chest, shortness of breath, diaphoresis, nausea, and 
vomiting.  She was seen in the emergency room and after receiving Cardizem, she 
actually converted back to sinus rhythm with resolution of her symptoms.  She 
was admitted for further evaluation.  Initial troponin was 0.04 and it peaked 
at 1.19.  The patient was treated with a heparin drip and she was seen in 
consultation by cardiology (Dr. Rousseau).  In summary, he felt that Aby Maynard is a 71-year-old with a prior longstanding poorly-controlled hypertension
, hypertensive and/or hypertrophic cardiomyopathy, highly symptomatic 
paroxysmal atrial fibrillation with prior evidence of asymptomatic sinus node 
dysfunction, currently on Tikosyn; CAD with probable MI in May 2016 status post 
PCI with drug-eluting stent to the distal LAD with residual intermediate mid LAD
, who presents with highly symptomatic episode of paroxysmal atrial 
fibrillation lasting 4 to 5 hours, now converted and asymptomatic in the 
setting of a recent respiratory tract infection.  Dr. Rousseau felt that the 
elevated troponin was likely a supply-demand related ischemia for rapid atrial 
fibrillation in the setting of LVH and known underlying CAD.  His 
recommendation was to continue aspirin, Brilinta, Tikosyn, calcium channel 
blocker and beta- blocker, and statins.

 

Of note is the fact that the patient's Eliquis was discontinued by Dr. Lafleur 
due to multiple episodes of bleeding.  The patient states that the plan is for 
her to continue aspirin and Brilinta until May and then at that time, the 
Brilinta will be discontinued and she will be started on Warfarin.  Dr. Rousseau 
felt that the patient could be an atrial fibrillation ablation candidate and 
recommended followup with Dr. Lafleur for further evaluation and management.

 

The patient had no other arrhythmias while in telemetry and only sinus 
bradycardia. Of note is the fact that her TSH in March was on the lower side at 
0.1.  Her PCP may need to repeat it and adjust her levothyroxine dose as 
indicated.

 

The patient is medically stable for discharge at this time.

 

PHYSICAL EXAMINATION:  Vital Signs:  Temperature 97.7, heart rate is 64, 
respiratory rate is 16, oxygen saturation 97% on room air, and blood pressure 
127/66.  General:  The patient is a pleasant lady, lying in bed, in no acute 
distress.  CVS:  Normal S1, S2.  Regular rate and rhythm.  Chest:  Breath 
sounds present bilaterally with no added sounds.  Extremities:  No edema.  Neuro
:  She is alert and oriented x3.  Able to move all 4 extremities.

 

DIET:  Heart healthy diet.

 

ACTIVITY:  As tolerated.

 

DISPOSITION:  To home.

 

STATUS WHILE IN THE HOSPITAL:  Inpatient.

 

If you need more information, please feel free to call me at [phone] or 
please obtain the full medical records.

 

TIME SPENT:  Approximately 45 minutes was spent to complete this discharge.



CC:  Jose Juan Aponte MD; Dr. Lafleur *

 

 [id number]/CPS #: [id number]

MTDD

## 2017-07-03 ENCOUNTER — HOSPITAL ENCOUNTER (EMERGENCY)
Dept: HOSPITAL 25 - ED | Age: 72
Discharge: HOME | End: 2017-07-03
Payer: MEDICARE

## 2017-07-03 VITALS — SYSTOLIC BLOOD PRESSURE: 101 MMHG | DIASTOLIC BLOOD PRESSURE: 54 MMHG
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
## 2017-07-03 DIAGNOSIS — Z79.01: ICD-10-CM

## 2017-07-03 DIAGNOSIS — S80.12XA: Primary | ICD-10-CM
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
## 2017-07-03 DIAGNOSIS — Z87.891: ICD-10-CM

## 2017-07-03 PROCEDURE — 36415 COLL VENOUS BLD VENIPUNCTURE: CPT

## 2017-07-03 PROCEDURE — 85610 PROTHROMBIN TIME: CPT

## 2017-07-03 PROCEDURE — 99281 EMR DPT VST MAYX REQ PHY/QHP: CPT

## 2017-07-03 NOTE — ED
Lower Extremity





- HPI Summary


HPI Summary: 


71F presents with left leg swelling in ankle for couple days.  She feel a week 

ago on her butt and she states she has been able to ambulate without a problem.

  She is on coumadin and her INR has been stable.  She noticed afterwards a 

swelling on left ankle but denies any trauma there.  she says she has noticed 

bruising to her left ankle that is getting worst along with swelling.  She 

denies any pain or trauma to the area.  She was sent in by her primary.  She 

does admit to left calf pain. 








- History of Current Complaint


Chief Complaint: EDExtremityLower


Stated Complaint: LEFT LEG POSIBLE BLOOD CLOT


Time Seen by Provider: 07/03/17 10:45


Pain Intensity: 0





- Allergies/Home Medications


Allergies/Adverse Reactions: 


 Allergies











Allergy/AdvReac Type Severity Reaction Status Date / Time


 


Bee Venom Allergy Severe Anaphylatic Verified 07/03/17 10:53





   Shock  


 


Sulfa Antibiotics Allergy Severe See Comment Verified 07/03/17 10:53














PMH/Surg Hx/FS Hx/Imm Hx


Endocrine/Hematology History: Reports: Hx Anticoagulant Therapy, Hx Thyroid 

Disease - hypothyroidism


Cardiovascular History: Reports: Hx Atrial Fibrillation, Hx Cardiac Arrest, Hx 

Coronary Artery Disease, Hx Hypercholesterolemia, Hx Hypertension, Other 

Cardiovascular Problems/Disorders - LEFT VENTRICULAR CARDIOMYOPATHY


   Denies: Hx Peripheral Vascular Disease


Respiratory History: Reports: Other Respiratory Problems/Disorders - new 

diagnosis emphysema 3 weeks ago


Musculoskeletal History: Reports: Hx Bursitis - RIGHT SHOULDER, Other 

Musculoskeletal History - NO ARTHRITIS, MUSCLES AND JOINTS HURT, MAINLY MUSCLES


   Denies: Hx Arthritis


Sensory History: Reports: Hx Cataracts - removed, Hx Contacts or Glasses - 

reading glasses


   Denies: Hx Hearing Aid


Opthamlomology History: Reports: Hx Cataracts - removed, Hx Contacts or Glasses 

- reading glasses


Neurological History: 


   Denies: Hx Headaches





- Cancer History


Hx Chemotherapy: No


Hx Radiation Therapy: No





- Surgical History


Surgery Procedure, Year, and Place: 1988 LAPAROSCOPIC BILATERAL TUBAL LIGATION, 

[facility] [place].  basal carcinoma face removed 1980


Hx Anesthesia Reactions: Yes - PANIC ATTACK WHEN WAKING UP/ EPINEPHRINE - MAKES 

JITTERY





- Immunization History


Date of Tetanus Vaccine: unk


Date of Influenza Vaccine: none


Infectious Disease History: No


Infectious Disease History: Reports: Hx Shingles


   Denies: Hx Clostridium Difficile, Hx Hepatitis - Pt states, NEVER had 

hepatitis, has been erroniously in chart for years, Hx Human Immunodeficiency 

Virus (HIV), Hx of Known/Suspected MRSA, Hx Tuberculosis, Hx Known/Suspected VRE

, Hx Known/Suspected VRSA, History Other Infectious Disease, Traveled Outside 

the [place] in Last 30 Days





- Family History


Known Family History: Positive: Cardiac Disease - Both parents, Other - CANCER





- Social History


Alcohol Use: None


Alcohol Amount: 2-3X/WEEK


Hx Substance Use: No


Substance Use Type: Reports: None


Hx Tobacco Use: Yes - SMOKED IN 20s


Smoking Status (MU): Former Smoker


Length of Time of Smoking/Using Tobacco: "smoked during my twenties"





Review of Systems


Negative: Fever


Negative: Chest Pain


Negative: Shortness Of Breath


Positive: Bruising - left ankle


All Other Systems Reviewed And Are Negative: Yes





Physical Exam


Triage Information Reviewed: Yes


Vital Signs On Initial Exam: 


 Initial Vitals











Temp Pulse Resp BP Pulse Ox


 


 97.8 F   50   18   124/64   98 


 


 07/03/17 10:34  07/03/17 10:34  07/03/17 10:34  07/03/17 10:34  07/03/17 10:34











Vital Signs Reviewed: Yes


Appearance: Positive: Well-Appearing


Skin: Positive: Other - ecchymosis noted on left ankle


Head/Face: Positive: Normal Head/Face Inspection


Eyes: Positive: Normal, Conjunctiva Clear


Respiratory/Lung Sounds: Positive: Clear to Auscultation, Breath Sounds Present


Cardiovascular: Positive: Normal, RRR


Musculoskeletal: Positive: Strength/ROM Intact - left ankle and knee, Edema 

Left - ankle, Other - good pulses, capillary refill< 2secs.  Negative: Anali 

Sign Left





Diagnostics





- Vital Signs


 Vital Signs











  Temp Pulse Resp BP Pulse Ox


 


 07/03/17 10:49  97.8 F  50  18  124/64  98


 


 07/03/17 10:34  97.8 F  50  18  124/64  98














- Laboratory


Lab Results: 


 Lab Results











  07/03/17 Range/Units





  11:34 


 


INR (Anticoag Therapy)  2.22 H  (0.89-1.11)  











Lab Statement: Any lab studies that have been ordered have been reviewed, and 

results considered in the medical decision making process.





- Ultrasound


  ** No standard instances


Ultrasound Interpretation: No Acute Changes


Ultrasound Interpretation Completed By: Radiologist





Lower Extremity Course/Dx





- Course


Course Of Treatment: 71F presents with left leg swelling in ankle for couple 

days.  She feel a week ago on her butt and she states she has been able to 

ambulate without a problem.  She is on coumadin and her INR has been stable.  

She noticed afterwards a swelling on left ankle but denies any trauma there.  

she says she has noticed bruising to her left ankle that is getting worst along 

with swelling.  She denies any pain or trauma to the area.  She was sent in by 

her primary.  She does admit to left calf pain.  u/s normal. area on knee feels 

like hematoma no sign of infection. ankle has ecchmyosis explained depedent 

from fall on butt. told to ice and elevate and will resolve with time. INR 2.2 

patient understands and agrees with plan





- Diagnoses


Differential Diagnosis/HQI/PQRI: Positive: Contusion, DVT, Sprain, Strain


Provider Diagnoses: 


 Contusion of left leg








Discharge





- Discharge Plan


Condition: Good


Disposition: HOME


Patient Education Materials:  Contusion in Adults (ED)


Referrals: 


Simi Owen MD [Primary Care Provider] - 


Additional Instructions: 


Take Tylenol for pain as needed


Place ice on area, elevate, place ace on area


Return to ED if develop any new or worsening symptoms

## 2017-07-03 NOTE — RAD
HISTORY: Left calf pain



COMPARISONS: None relevant



TECHNIQUE: Multiple transverse and longitudinal ultrasound images were obtained of the

left lower extremity from the level of the common femoral vein inferiorly through to the

infrapopliteal veins  using grayscale, color Doppler, and spectral Doppler imaging with

and without compression and with augmentation. Comparison images were obtained of the

contralateral common femoral vein.



FINDINGS:

VEINS: The venous system of the left lower extremity is compressible throughout its

course, with normal flow on color Doppler imaging and normal response to augmentation on

spectral Doppler imaging.



SOFT TISSUES: Unremarkable.



OTHER FINDINGS: None.



IMPRESSION: 

NO LEFT LOWER EXTREMITY DEEP VEIN THROMBOSIS

## 2017-07-28 ENCOUNTER — HOSPITAL ENCOUNTER (EMERGENCY)
Dept: HOSPITAL 25 - ED | Age: 72
Discharge: HOME | End: 2017-07-28
Payer: MEDICARE

## 2017-07-28 VITALS — DIASTOLIC BLOOD PRESSURE: 64 MMHG | SYSTOLIC BLOOD PRESSURE: 110 MMHG

## 2017-07-28 DIAGNOSIS — M25.562: ICD-10-CM

## 2017-07-28 DIAGNOSIS — M85.80: ICD-10-CM

## 2017-07-28 DIAGNOSIS — Z87.891: ICD-10-CM
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
## 2017-07-28 DIAGNOSIS — S62.307A: Primary | ICD-10-CM
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
## 2017-07-28 DIAGNOSIS — Z79.01: ICD-10-CM

## 2017-07-28 DIAGNOSIS — S80.02XA: ICD-10-CM
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
## 2017-07-28 LAB
ALBUMIN SERPL BCG-MCNC: 3.8 G/DL (ref 3.2–5.2)
ALP SERPL-CCNC: 86 U/L (ref 34–104)
ALT SERPL W P-5'-P-CCNC: 24 U/L (ref 7–52)
ANION GAP SERPL CALC-SCNC: 3 MMOL/L (ref 2–11)
AST SERPL-CCNC: 19 U/L (ref 13–39)
BUN SERPL-MCNC: 16 MG/DL (ref 6–24)
BUN/CREAT SERPL: 22.9 (ref 8–20)
CALCIUM SERPL-MCNC: 9.2 MG/DL (ref 8.6–10.3)
CHLORIDE SERPL-SCNC: 104 MMOL/L (ref 101–111)
GLOBULIN SER CALC-MCNC: 2.6 G/DL (ref 2–4)
GLUCOSE SERPL-MCNC: 96 MG/DL (ref 70–100)
HCO3 SERPL-SCNC: 29 MMOL/L (ref 22–32)
HCT VFR BLD AUTO: 40 % (ref 35–47)
HGB BLD-MCNC: 13.4 G/DL (ref 12–16)
MCH RBC QN AUTO: 30 PG (ref 27–31)
MCHC RBC AUTO-ENTMCNC: 33 G/DL (ref 31–36)
MCV RBC AUTO: 91 FL (ref 80–97)
POTASSIUM SERPL-SCNC: 4.3 MMOL/L (ref 3.5–5)
PROT SERPL-MCNC: 6.4 G/DL (ref 6.4–8.9)
RBC # BLD AUTO: 4.4 10^6/UL (ref 4–5.4)
SODIUM SERPL-SCNC: 136 MMOL/L (ref 133–145)
WBC # BLD AUTO: 9.2 10^3/UL (ref 3.5–10.8)

## 2017-07-28 PROCEDURE — 85610 PROTHROMBIN TIME: CPT

## 2017-07-28 PROCEDURE — 80053 COMPREHEN METABOLIC PANEL: CPT

## 2017-07-28 PROCEDURE — 36415 COLL VENOUS BLD VENIPUNCTURE: CPT

## 2017-07-28 PROCEDURE — 99282 EMERGENCY DEPT VISIT SF MDM: CPT

## 2017-07-28 PROCEDURE — 85025 COMPLETE CBC W/AUTO DIFF WBC: CPT

## 2017-07-28 PROCEDURE — 85730 THROMBOPLASTIN TIME PARTIAL: CPT

## 2017-07-28 NOTE — ED
Lower Extremity





- HPI Summary


HPI Summary: 





71 female presents with complaints of left knee and left hand/wrist swelling 

after sustaining a mechanical fall when trying to move too quickly that 

occurred yesterday 7/27/17 around 12pm. Patient is on anticouagulnat, coumadin 

and aspirin. Patient states pain was less and she was able to ambulate easier 

yesterday after Tylenol however after waking up this morning she was unable due 

to the significance of swelling. Patient states the swelling is painful, firm 

and much more than the right. "the size of my head". Admits to some bruises on 

left arm and leg. Denies syncope, hitting he head, LOC and any other injuries. 

No back, neck, hip or abdominal pain. No chest pain or difficulty breathing. 

PMHx significant for MI and Afib. Took Tyelnol this morning around 6am, 2 

pills. Pain is about 3/10 unless she tries to move her left leg or bear weight. 

Left hand/wrist is significantly swollen and bruised as well however, minimal 

to no pain with movement.





- History of Current Complaint


Chief Complaint: EDExtremityLower


Stated Complaint: LT KNEE COMPLAINT


Time Seen by Provider: 07/28/17 10:21


Hx Obtained From: Patient


Mechanism Of Injury: Direct Blow, Fall From A Standing Position


Onset of Pain: Hours, Post Accident


Onset/Duration: Days - 1


Severity Initially: Mild


Severity Currently: Moderate


Pain Intensity: 3


Pain Scale Used: 0-10 Numeric


Location: Is Discrete @ - left knee, left hand/wrist


Character Of Pain: Aching, Throbbing, Stiffness - firm


Associated Signs And Symptoms: Positive: Swelling, Bruising, Knee Pain


Aggravating Factor(s): Standing, Ambulation, Weight Bearing


Alleviating Factor(s): Rest, Elevation, Ice


Able to Bear Weight: No - due to pain





- Allergies/Home Medications


Allergies/Adverse Reactions: 


 Allergies











Allergy/AdvReac Type Severity Reaction Status Date / Time


 


Bee Venom Allergy Severe Anaphylatic Verified 07/03/17 10:53





   Shock  


 


Sulfa Antibiotics Allergy Severe See Comment Verified 07/03/17 10:53














PMH/Surg Hx/FS Hx/Imm Hx


Endocrine/Hematology History: Reports: Hx Anticoagulant Therapy, Hx Thyroid 

Disease - hypothyroidism


Cardiovascular History: Reports: Hx Atrial Fibrillation, Hx Cardiac Arrest, Hx 

Coronary Artery Disease, Hx Hypercholesterolemia, Hx Hypertension, Other 

Cardiovascular Problems/Disorders - LEFT VENTRICULAR CARDIOMYOPATHY


   Denies: Hx Peripheral Vascular Disease


Respiratory History: Reports: Other Respiratory Problems/Disorders - new 

diagnosis emphysema 3 weeks ago


Musculoskeletal History: Reports: Hx Bursitis - RIGHT SHOULDER, Other 

Musculoskeletal History - NO ARTHRITIS, MUSCLES AND JOINTS HURT, MAINLY MUSCLES


   Denies: Hx Arthritis


Sensory History: Reports: Hx Cataracts - removed, Hx Contacts or Glasses - 

reading glasses


   Denies: Hx Hearing Aid


Opthamlomology History: Reports: Hx Cataracts - removed, Hx Contacts or Glasses 

- reading glasses


Neurological History: 


   Denies: Hx Headaches





- Cancer History


Hx Chemotherapy: No


Hx Radiation Therapy: No





- Surgical History


Surgery Procedure, Year, and Place: 1988 LAPAROSCOPIC BILATERAL TUBAL LIGATION, 

[facility].  basal carcinoma face removed 1980


Hx Anesthesia Reactions: Yes - PANIC ATTACK WHEN WAKING UP/ EPINEPHRINE - MAKES 

JITTERY





- Immunization History


Date of Tetanus Vaccine: unk


Date of Influenza Vaccine: none


Infectious Disease History: Reports: Hx Shingles


   Denies: Hx Clostridium Difficile, Hx Hepatitis - Pt states, NEVER had 

hepatitis, has been erroniously in chart for years, Hx Human Immunodeficiency 

Virus (HIV), Hx of Known/Suspected MRSA, Hx Tuberculosis, Hx Known/Suspected VRE

, Hx Known/Suspected VRSA, History Other Infectious Disease, Traveled Outside 

the [place] in Last 30 Days





- Family History


Known Family History: Positive: Cardiac Disease - Both parents, Other - CANCER





- Social History


Alcohol Use: None


Alcohol Amount: 2-3X/WEEK


Hx Substance Use: No


Substance Use Type: Reports: None


Hx Tobacco Use: Yes - SMOKED IN 20s


Smoking Status (MU): Former Smoker


Length of Time of Smoking/Using Tobacco: "smoked during my twenties"





Review of Systems


Constitutional: Negative


Cardiovascular: Negative


Respiratory: Negative


Gastrointestinal: Negative


Positive: Arthralgia, Myalgia, Decreased ROM, Edema - left knee and left hand


Positive: Bruising - left arm, left hand, left knee


Neurological: Negative


All Other Systems Reviewed And Are Negative: Yes





Physical Exam


Triage Information Reviewed: Yes


Vital Signs On Initial Exam: 


 Initial Vitals











Temp Pulse Resp BP Pulse Ox


 


 97.6 F   45   18   135/69   98 


 


 07/28/17 10:13  07/28/17 10:13  07/28/17 10:13  07/28/17 10:13  07/28/17 10:13








bradycardia noted however compared to previous visits and is normal for her. 

patient also states "it is always 40-50s please do not work me up for it".


Vital Signs Reviewed: Yes


Appearance: Positive: Well-Appearing, No Pain Distress, Well-Nourished


Skin: Positive: Warm, Skin Color Reflects Adequate Perfusion, Dry, Other - 

significant amount of edema to left hand and left knee with ecchymosis 

throughout. no lacerations, rest of skin exam normal. deformity of left hand 

and knee due to edema. no other crepitus or step off. no sign of 5 P's at this 

time..  Negative: Cold, Numb, Pale


Head/Face: Positive: Normal Head/Face Inspection.  Negative: TMJ Tenderness, 

Scalp, Cephalohematoma


Eyes: Positive: Normal, Conjunctiva Clear


ENT: Positive: Hearing grossly normal


Neck: Positive: Supple, Nontender, No Lymphadenopathy


Respiratory/Lung Sounds: Positive: Clear to Auscultation, Breath Sounds 

Present.  Negative: Rales, Rhonchi, Wheezes


Cardiovascular: Positive: Normal, RRR, Pulses are Symmetrical in both Upper and 

Lower Extremities - 1+ pedal bilaterally.  Negative: Murmur, Rub


Abdomen Description: Positive: Nontender, No Organomegaly, Soft, Other: - no 

sign of bruising or edema.  Negative: Bruit, CVA Tenderness (R), CVA Tenderness 

(L), Distended, Guarding, Peritoneal Signs, Pulsatile Mass


Bowel Sounds: Positive: Present


Musculoskeletal: Positive: Strength/ROM Intact - all extremities beside left 

knee. left wrist and hand FROM, Limited @ - left knee with flexion extension 

due to pain and swelling, tender on palpation, Pain @ - on palpation of base of 

hand wrist, Edema Left - left knee 49cm and right side 38.5cm. left hand also 

edematous when compared to right, both with ecchymosis throughout.  Negative: 

Interruption @


Neurological: Positive: Normal, Sensory/Motor Intact - sensation intact, Alert, 

Oriented to Person Place, Time, CN Intact II-III, NV Bundle Intact Distally, 

Unable to Assess Gait - due to pain and unable to bear weight on left leg


Psychiatric: Positive: Affect/Mood Appropriate





- Thao Coma Scale


Best Eye Response: 4 - Spontaneous


Best Motor Response: 6 - Obeys Commands


Best Verbal Response: 5 - Oriented





Procedures





- Splinting


Location: left hand/forearm


Hand-Made Type: plaster 


Splint: ulnar - ulnar gutter


Pre-Proc Neuro Vasc Exam: normal


Post-Proc Neuro Vasc Exam: normal, unchanged from pre-exam





Diagnostics





- Vital Signs


 Vital Signs











  Temp Pulse Resp BP Pulse Ox


 


 07/28/17 10:35  97.6 F  45  18  135/69  98


 


 07/28/17 10:13  97.6 F  45  18  135/69  98














- Laboratory


Result Diagrams: 


 07/28/17 12:48





 07/28/17 12:48


Lab Statement: Any lab studies that have been ordered have been reviewed, and 

results considered in the medical decision making process.





- Radiology


  ** left hand/wrist


Xray Interpretation: Positive (See Comments) - 1. NONDISPLACED FRACTURE OF THE 

BASE OF THE FIFTH METACARPAL. 2. OSTEOPENIA. 3. OSTEOARTHRITIS.


Radiology Interpretation Completed By: Radiologist





- CT


  ** left knee


CT Interpretation: Positive (See Comments) - Hyperdense mass in the medial 

aspect of the knee which appears to be superficial to the joint space and in 

the vastus medialis muscle measuring 13.0 x 7.8 x 4.4 cm. This is consistent 

with a hematoma.


CT Interpretation Completed By: Radiologist





Re-Evaluation





- Re-Evaluation


  ** First Eval


Re-Evaluation Time: 13:40


Change: Unchanged - still feels well, no complaints only pain with movement 

would not like more pain managemet at this time





Lower Extremity Course/Dx





- Course


Course Of Treatment: H&H and INR obtained in normal range, INR slightly low. Ct 

of left knee obtained and showed significantly sized hematoma. left hand/wrist x

-ray obtained and positive for fifth base of metacarpal fracture. Patient did 

not want any more pain management at this time, comfortable when not moving 

left leg. No other concern for emergent etiology other than presenting symptoms/

extremity injuries. left hand was placed in an ulnar gutter splint without 

complication, good neuro vasc both before and after. Follow up ortho. Knee 

immobilzer RICE and tylenol. Aware of worsening signs and symptoms.





- Diagnoses


Differential Diagnosis/HQI/PQRI: Positive: Contusion, Dislocation, Fracture (

Closed), Sprain, Strain, Other - contusion, hematoma


Provider Diagnoses: 


 Traumatic hematoma of left knee, On anticoagulant therapy, Fracture of fifth 

metacarpal bone of left hand








- Physician Notifications


Discussed Care Of Patient With: Dr Titus, Dr Hart





Discharge





- Discharge Plan


Condition: Stable


Disposition: HOME


Patient Education Materials:  Hematoma (ED), Hand Fracture (ED)


Referrals: 


Simi Owen MD [Primary Care Provider] - 


Chris Onofre MD [Medical Doctor] - 


Additional Instructions: 


Follow up with orthopedics, call and make an appointment. 


Rest, elevate, ice and compression with knee immobilizer for both hand and 

especially knee.


Tylenol for pain.


If pain increases, new symptoms develop as we discussed such as numbness, 

splint becoming too tight, pale, blue skin please return to ED immediately. 


Follow up with PCP.

## 2017-07-28 NOTE — RAD
Indication: Knee swelling.



CT of the knee was obtained in the axial plane. Sagittal and coronal reconstructed images

were obtained.



In the medial aspect of the left knee there is in the subcutaneous tissues superficial to

the vastus medialis muscle is a mass that is hyperdense measuring 13 cm in length x 7.8 cm

AP x 4.4 cm in width. This is consistent with a hematoma. This is superficial to the

patella and infrapatellar tendon. No intra-articular extension is noted.



IMPRESSION: Hyperdense mass in the medial aspect of the knee which appears to be

superficial to the joint space and in the vastus medialis muscle measuring 13.0 x 7.8 x

4.4 cm. This is consistent with a hematoma.

## 2017-07-28 NOTE — RAD
HISTORY: Pain, injury, left hand



COMPARISONS: None



VIEWS: 4, Frontal, lateral, and oblique views of the left hand



FINDINGS:



BONE DENSITY: There is diffuse osteopenia.

BONES: There is a nondisplaced fracture of the base of the fifth metacarpal.    

JOINTS: There is osteoporosis of the first CMC and interphalangeal joints.    

ALIGNMENT: There is no dislocation. 

SOFT TISSUES: Unremarkable.



OTHER FINDINGS: None.



IMPRESSION: 

1.  NONDISPLACED FRACTURE OF THE BASE OF THE FIFTH METACARPAL.

2.  OSTEOPENIA.

3.  OSTEOARTHRITIS.

## 2018-07-04 ENCOUNTER — HOSPITAL ENCOUNTER (EMERGENCY)
Dept: HOSPITAL 25 - UCEAST | Age: 73
Discharge: HOME | End: 2018-07-04
Payer: MEDICARE

## 2018-07-04 VITALS — DIASTOLIC BLOOD PRESSURE: 73 MMHG | SYSTOLIC BLOOD PRESSURE: 143 MMHG

## 2018-07-04 DIAGNOSIS — Z87.891: ICD-10-CM

## 2018-07-04 DIAGNOSIS — Z91.030: ICD-10-CM

## 2018-07-04 DIAGNOSIS — Z88.2: ICD-10-CM

## 2018-07-04 DIAGNOSIS — I48.91: ICD-10-CM

## 2018-07-04 DIAGNOSIS — L72.3: Primary | ICD-10-CM

## 2018-07-04 DIAGNOSIS — I25.2: ICD-10-CM

## 2018-07-04 DIAGNOSIS — Z79.82: ICD-10-CM

## 2018-07-04 DIAGNOSIS — Z88.0: ICD-10-CM

## 2018-07-04 DIAGNOSIS — Z79.01: ICD-10-CM

## 2018-07-04 PROCEDURE — 87205 SMEAR GRAM STAIN: CPT

## 2018-07-04 PROCEDURE — 99212 OFFICE O/P EST SF 10 MIN: CPT

## 2018-07-04 PROCEDURE — 87640 STAPH A DNA AMP PROBE: CPT

## 2018-07-04 PROCEDURE — 87070 CULTURE OTHR SPECIMN AEROBIC: CPT

## 2018-07-04 PROCEDURE — G0463 HOSPITAL OUTPT CLINIC VISIT: HCPCS

## 2018-07-04 PROCEDURE — 87641 MR-STAPH DNA AMP PROBE: CPT

## 2018-07-04 NOTE — UC
Argelia LIU Julia, scribed for Shilpi Lowe MD on 07/04/18 at 0901 .





Skin Complaint HPI





- HPI Summary


HPI Summary: 





A 72 year old F presents to [facility] with a chief complaint of a cyst on back 

that has been getting bigger for the past month, that opened yesterday and is 

now draining "pus-like" fluid. States cyst has been present and pain free for 

the past year. She has seen dermatologist previously. Denies fever and odor. 

Denies history of MRSA.  She denies fevers or chills.  Patient denies 

headaches.  Patient states Shabbir to bother her on Monday she called her 

dermatologist as an appointment on July 18 to have it removed.  Blister 

draining last night patient decided to get checked.  Patient is not 

immunocompromised.





Patient has history of a-fib and MI x 2. Currently taking coumadin. Levels 

checked yesterday with a level of 2.3.


Medications and Allergies reviewed. States she has tolerated amoxicillin well 

previously. 





- History of Current Complaint


Chief Complaint: UCSkin


Stated Complaint: SKIN COMPLAINT


Hx Obtained From: Patient


Onset/Duration: Lasting Weeks, Worse Since - yesterday


Skin Exposure Onset/Duration: Worse Since: - yesterday


Onset Severity: Mild


Current Severity: Moderate


Pain Intensity: 0


Location: Other - back


Alleviating Factor(s): Nothing


Associated Signs & Symptoms: Positive: Drainage.  Negative: Fever





- Allergy/Home Medications


Allergies/Adverse Reactions: 


 Allergies











Allergy/AdvReac Type Severity Reaction Status Date / Time


 


bee venom protein (honey bee) Allergy Severe Anaphylatic Verified 07/04/18 08:20





   Shock  


 


Sulfa (Sulfonamide AdvReac Severe See Comment Verified 07/04/18 08:20





Antibiotics)     


 


cephalexin AdvReac  Diarrhea Verified 07/04/18 08:20











Home Medications: 


 Home Medications





Aspirin EC TAB* [Ecotrin EC Low Dose 81 MG*] 81 mg PO BEDTIME 07/04/18 [History 

Confirmed 07/04/18]


Atorvastatin* [Lipitor 40 MG*] 20 mg PO BEDTIME 07/04/18 [History Confirmed 07/ 04/18]


Cholecalciferol (Vitamin D3) [Vitamin D3] 1,000 unit PO DAILY 07/04/18 [History 

Confirmed 07/04/18]


Diltiazem TAB* [Cardizem 60 MG Tab*] 120 mg PO DAILY 07/04/18 [History 

Confirmed 07/04/18]


Heart/Gut Tincture  07/04/18 [History]


Levothyroxine TAB* [Synthroid TAB*] 100 mcg PO DAILY 07/04/18 [History 

Confirmed 07/04/18]


Magnesium Glycinate [Mag Glycinate] 400 mg PO BID 07/04/18 [History Confirmed 07 /04/18]


Metoprolol Tartrate TAB* [Lopressor TAB*] 12.5 mg PO .MO,WED,FR,SAT 07/04/18 [

History Confirmed 07/04/18]


Omega-3 Fatty Acids/Fish Oil [Omega 3] 1,280 mg PO DAILY 07/04/18 [History 

Confirmed 07/04/18]


Potassium Chlor TAB* [Klor Con ER TAB*] 20 meq PO TID 07/04/18 [History 

Confirmed 07/04/18]


Primal Defense Probiotic 1 tab PO DAILY 07/04/18 [History Confirmed 07/04/18]


Warfarin TAB(*) [Coumadin TAB(*)] 5 - 7.5 mg PO DAILY 07/04/18 [History 

Confirmed 07/04/18]











Review of Systems


Constitutional: Negative


Skin: Other - draining cyst


All Other Systems Reviewed And Are Negative: Yes





PMH/Surg Hx/FS Hx/Imm Hx


Previously Healthy: Yes


Cardiovascular History: Myocardial Infarction, Atrial Fibrillation


Other History Of: Anticoagulant Therapy





- Surgical History


Surgical History: Yes


Surgery Procedure, Year, and Place: 1988 LAPAROSCOPIC BILATERAL TUBAL LIGATION, 

[facility].  basal carcinoma face removed 1980.  STENT LAD CARDIAC 2016





- Family History


Known Family History: Positive: Cardiac Disease - Both parents, Other - CANCER





- Social History


Occupation: Employed Full-time - bookeeper


Alcohol Use: None


Alcohol Amount: 2-3X/WEEK


Substance Use Type: None


Smoking Status (MU): Former Smoker


Length of Time of Smoking/Using Tobacco: "smoked during my twenties"


When Did the Patient Quit Smoking/Using Tobacco: 1967





- Immunization History


Most Recent Influenza Vaccination: never


Most Recent Tetanus Shot: 2017


Most Recent Pneumonia Vaccination: never





Physical Exam





- Summary


Physical Exam Summary: 





Vital Signs Reviewed: Yes


A+Ox3, no distress


Eyes: Conjunctiva Clear, MONIK, EOM intact and 


ENT: Hearing grossly normal  TM x 2 clear  mmmoist


neck: supple


Respiratory: Positive: No respiratory distress, No accessory muscle use CTA 

throughout  no w/r  


Cardiovascular: skin color reflect adequate perfusion RRR nl s1, s2  no m/r


Musculoskeletal Exam: CAMPBELL x 4 without difficulty 


Neurological: Positive: Alert,  ambulatory without difficulty


Psychological: Positive: Normal Response To Family


Skin: Positive: no rash, no ecchymosis  


Pt with sebaceous cyst upper back. pt with erythema and warmth over cyst. cyst 

open and draining gelatenous white discharge, no odor, mild bleeding - cultures 

taken





Triage Information Reviewed: Yes


Vital Signs: 


 Initial Vital Signs











Temp  98.1 F   07/04/18 08:13


 


Pulse  52   07/04/18 08:13


 


Resp  16   07/04/18 08:13


 


BP  143/73   07/04/18 08:13


 


Pulse Ox  95   07/04/18 08:13














Course/Dx





- Course


Course Of Treatment: Patient presents with epidermal cyst that appears 

infected.  Such as opening and draining able to express some white thick 

substance.  Culture taken.  We'll start patient on amoxicillin.  Recommend heat 

soaks.  Monitor for increasing worsening infection.  Patient has appointment 

with her dermatologist to have the cyst removed in the middle of the month.  

Return precautions discussed.  Discussed with patient likely that this levels 

will increase given him antibiotics.  Patient comfortable in agreement with 

plan.





- Diagnoses


Provider Diagnoses: infected epidermyl cyst





Discharge





- Sign-Out/Discharge


Documenting (check all that apply): Discharge/Admit/Transfer





- Discharge Plan


Condition: Stable


Disposition: HOME


Prescriptions: 


Amoxicillin PO (*) [Amoxicillin 500 MG CAP*] 500 mg PO Q12H #14 cap


Patient Education Materials:  Cellulitis (ED), Epidermal Inclusion Cysts (ED)


Referrals: 


Susana Castañeda MD [Primary Care Provider] - 


Camden Fernandez MD [Family Provider] - 


Additional Instructions: 


- Apply warm wet soaks 2-3 times a day for the next several days.


- Cover wound with a layer of antibiotic ointment such as Neosporin or 

Polysporin.  Cover with a bandage.


- Okay to take Tylenol as needed for discomfort.


- Take antibiotics as prescribed until gone.  Please note that antibiotics may 

cause a slight elevation in Coumadin levels.


- a sample from you wound was sent for additional testing. If you need a 

different antibiotics, you will receive a call from a care team member


- If he developed increased pain, drainage, fevers, chills or any other 

concerns is recommended with the emergency department for further evaluation.


- Keep her appointment with dermatology as scheduled on July 18.





- Billing Disposition and Condition


Condition: STABLE


Disposition: Home





The documentation as recorded by the Argelia abarca Julia accurately reflects 

the service I personally performed and the decisions made by me, Shilpi Lowe MD.

## 2018-07-05 NOTE — ED
Progress





- Progress Note


Progress Note: 





Preliminary wound culture demonstrated MRSA negative, staph aureus negative.  

There is 1+ gram-positive cocci in clusters resembling staph but appears to be 

non-staph aureus.  Amoxicillin prescribed to patient should suffice.





Course/Dx





- Course


Course Of Treatment: Patient presents with epidermal cyst that appears 

infected.  Such as opening and draining able to express some white thick 

substance.  Culture taken.  We'll start patient on amoxicillin.  Recommend heat 

soaks.  Monitor for increasing worsening infection.  Patient has appointment 

with her dermatologist to have the cyst removed in the middle of the month.  

Return precautions discussed.  Discussed with patient likely that this levels 

will increase given him antibiotics.  Patient comfortable in agreement with 

plan.





Discharge





- Sign-Out/Discharge


Documenting (check all that apply): Discharge/Admit/Transfer





- Discharge Plan


Condition: Stable


Disposition: HOME


Prescriptions: 


Amoxicillin PO (*) [Amoxicillin 500 MG CAP*] 500 mg PO Q12H #14 cap


Patient Education Materials:  Cellulitis (ED), Epidermal Inclusion Cysts (ED)


Referrals: 


Camden Fernandez MD [Family Provider] - 


Susana Castañeda MD [Primary Care Provider] - 


Additional Instructions: 


- Apply warm wet soaks 2-3 times a day for the next several days.


- Cover wound with a layer of antibiotic ointment such as Neosporin or 

Polysporin.  Cover with a bandage.


- Okay to take Tylenol as needed for discomfort.


- Take antibiotics as prescribed until gone.  Please note that antibiotics may 

cause a slight elevation in Coumadin levels.


- a sample from you wound was sent for additional testing. If you need a 

different antibiotics, you will receive a call from a care team member


- If he developed increased pain, drainage, fevers, chills or any other 

concerns is recommended with the emergency department for further evaluation.


- Keep her appointment with dermatology as scheduled on July 18.





- Billing Disposition and Condition


Condition: STABLE


Disposition: Home

## 2018-12-14 ENCOUNTER — HOSPITAL ENCOUNTER (EMERGENCY)
Dept: HOSPITAL 25 - ED | Age: 73
LOS: 1 days | Discharge: HOME | End: 2018-12-15
Payer: MEDICARE

## 2018-12-14 DIAGNOSIS — Z88.2: ICD-10-CM

## 2018-12-14 DIAGNOSIS — Z87.891: ICD-10-CM

## 2018-12-14 DIAGNOSIS — Z82.49: ICD-10-CM

## 2018-12-14 DIAGNOSIS — Z80.8: ICD-10-CM

## 2018-12-14 DIAGNOSIS — I10: ICD-10-CM

## 2018-12-14 DIAGNOSIS — Z86.74: ICD-10-CM

## 2018-12-14 DIAGNOSIS — I48.0: ICD-10-CM

## 2018-12-14 DIAGNOSIS — I25.10: ICD-10-CM

## 2018-12-14 DIAGNOSIS — Z91.030: ICD-10-CM

## 2018-12-14 DIAGNOSIS — Z95.5: ICD-10-CM

## 2018-12-14 DIAGNOSIS — R00.1: ICD-10-CM

## 2018-12-14 DIAGNOSIS — Z88.1: ICD-10-CM

## 2018-12-14 DIAGNOSIS — R07.89: Primary | ICD-10-CM

## 2018-12-14 LAB
BASOPHILS # BLD AUTO: 0.1 10^3/UL (ref 0–0.2)
EOSINOPHIL # BLD AUTO: 0.1 10^3/UL (ref 0–0.6)
HCT VFR BLD AUTO: 47 % (ref 35–47)
HGB BLD-MCNC: 16 G/DL (ref 12–16)
INR PPP/BLD: 1.74 (ref 0.77–1.02)
LYMPHOCYTES # BLD AUTO: 2.1 10^3/UL (ref 1–4.8)
MCH RBC QN AUTO: 30 PG (ref 27–31)
MCHC RBC AUTO-ENTMCNC: 34 G/DL (ref 31–36)
MCV RBC AUTO: 90 FL (ref 80–97)
MONOCYTES # BLD AUTO: 0.7 10^3/UL (ref 0–0.8)
NEUTROPHILS # BLD AUTO: 4.2 10^3/UL (ref 1.5–7.7)
NRBC # BLD AUTO: 0 10^3/UL
NRBC BLD QL AUTO: 0
PLATELET # BLD AUTO: 190 10^3/UL (ref 150–450)
RBC # BLD AUTO: 5.28 10^6/UL (ref 4–5.4)
WBC # BLD AUTO: 7.2 10^3/UL (ref 3.5–10.8)

## 2018-12-14 PROCEDURE — 36415 COLL VENOUS BLD VENIPUNCTURE: CPT

## 2018-12-14 PROCEDURE — 84484 ASSAY OF TROPONIN QUANT: CPT

## 2018-12-14 PROCEDURE — 80053 COMPREHEN METABOLIC PANEL: CPT

## 2018-12-14 PROCEDURE — 99284 EMERGENCY DEPT VISIT MOD MDM: CPT

## 2018-12-14 PROCEDURE — 85025 COMPLETE CBC W/AUTO DIFF WBC: CPT

## 2018-12-14 PROCEDURE — 93005 ELECTROCARDIOGRAM TRACING: CPT

## 2018-12-14 PROCEDURE — 71045 X-RAY EXAM CHEST 1 VIEW: CPT

## 2018-12-14 PROCEDURE — 85610 PROTHROMBIN TIME: CPT

## 2018-12-14 PROCEDURE — 83605 ASSAY OF LACTIC ACID: CPT

## 2018-12-15 VITALS — DIASTOLIC BLOOD PRESSURE: 65 MMHG | SYSTOLIC BLOOD PRESSURE: 110 MMHG

## 2018-12-15 LAB — AST SERPL-CCNC: 22 U/L (ref 13–39)
